# Patient Record
Sex: FEMALE
[De-identification: names, ages, dates, MRNs, and addresses within clinical notes are randomized per-mention and may not be internally consistent; named-entity substitution may affect disease eponyms.]

---

## 2017-07-12 ENCOUNTER — OTHER (OUTPATIENT)
Age: 22
End: 2017-07-12

## 2017-07-17 ENCOUNTER — APPOINTMENT (OUTPATIENT)
Dept: OBGYN | Facility: CLINIC | Age: 22
End: 2017-07-17

## 2017-07-17 VITALS
SYSTOLIC BLOOD PRESSURE: 100 MMHG | WEIGHT: 127 LBS | BODY MASS INDEX: 23.37 KG/M2 | HEIGHT: 62 IN | DIASTOLIC BLOOD PRESSURE: 60 MMHG

## 2017-07-20 ENCOUNTER — APPOINTMENT (OUTPATIENT)
Dept: SURGICAL ONCOLOGY | Facility: CLINIC | Age: 22
End: 2017-07-20

## 2017-07-20 VITALS
RESPIRATION RATE: 15 BRPM | BODY MASS INDEX: 23.37 KG/M2 | DIASTOLIC BLOOD PRESSURE: 72 MMHG | HEIGHT: 62 IN | SYSTOLIC BLOOD PRESSURE: 111 MMHG | WEIGHT: 127 LBS | HEART RATE: 73 BPM

## 2017-08-08 ENCOUNTER — TRANSCRIPTION ENCOUNTER (OUTPATIENT)
Age: 22
End: 2017-08-08

## 2018-07-12 ENCOUNTER — APPOINTMENT (OUTPATIENT)
Dept: SURGICAL ONCOLOGY | Facility: CLINIC | Age: 23
End: 2018-07-12
Payer: COMMERCIAL

## 2018-07-12 VITALS
BODY MASS INDEX: 23.92 KG/M2 | WEIGHT: 130 LBS | HEIGHT: 62 IN | HEART RATE: 71 BPM | SYSTOLIC BLOOD PRESSURE: 112 MMHG | DIASTOLIC BLOOD PRESSURE: 75 MMHG | OXYGEN SATURATION: 100 %

## 2018-07-12 PROCEDURE — 99213 OFFICE O/P EST LOW 20 MIN: CPT

## 2018-07-16 ENCOUNTER — APPOINTMENT (OUTPATIENT)
Dept: OBGYN | Facility: CLINIC | Age: 23
End: 2018-07-16
Payer: COMMERCIAL

## 2018-07-16 VITALS
SYSTOLIC BLOOD PRESSURE: 118 MMHG | BODY MASS INDEX: 23.37 KG/M2 | HEIGHT: 62 IN | WEIGHT: 127 LBS | DIASTOLIC BLOOD PRESSURE: 70 MMHG

## 2018-07-16 PROCEDURE — 99395 PREV VISIT EST AGE 18-39: CPT

## 2018-07-19 ENCOUNTER — APPOINTMENT (OUTPATIENT)
Dept: SURGICAL ONCOLOGY | Facility: CLINIC | Age: 23
End: 2018-07-19

## 2018-07-23 LAB — CYTOLOGY CVX/VAG DOC THIN PREP: NORMAL

## 2019-07-18 ENCOUNTER — APPOINTMENT (OUTPATIENT)
Dept: SURGICAL ONCOLOGY | Facility: CLINIC | Age: 24
End: 2019-07-18
Payer: COMMERCIAL

## 2019-07-18 VITALS
BODY MASS INDEX: 22.45 KG/M2 | WEIGHT: 122 LBS | HEART RATE: 75 BPM | DIASTOLIC BLOOD PRESSURE: 71 MMHG | SYSTOLIC BLOOD PRESSURE: 110 MMHG | RESPIRATION RATE: 15 BRPM | HEIGHT: 62 IN

## 2019-07-18 PROCEDURE — 99214 OFFICE O/P EST MOD 30 MIN: CPT

## 2019-07-22 ENCOUNTER — APPOINTMENT (OUTPATIENT)
Dept: OBGYN | Facility: CLINIC | Age: 24
End: 2019-07-22
Payer: COMMERCIAL

## 2019-07-22 VITALS
HEIGHT: 62 IN | SYSTOLIC BLOOD PRESSURE: 106 MMHG | BODY MASS INDEX: 21.9 KG/M2 | DIASTOLIC BLOOD PRESSURE: 60 MMHG | WEIGHT: 119 LBS

## 2019-07-22 PROCEDURE — 99395 PREV VISIT EST AGE 18-39: CPT

## 2019-07-22 NOTE — PHYSICAL EXAM
[Awake] : awake [Alert] : alert [Acute Distress] : no acute distress [LAD] : no lymphadenopathy [Thyroid Nodule] : no thyroid nodule [Goiter] : no goiter [Mass] : no breast mass [Nipple Discharge] : no nipple discharge [Axillary LAD] : no axillary lymphadenopathy [Soft] : soft [Tender] : non tender [Oriented x3] : oriented to person, place, and time [Normal] : uterus [No Bleeding] : there was no active vaginal bleeding [Uterine Adnexae] : were not tender and not enlarged [RRR, No Murmurs] : RRR, no murmurs [CTAB] : CTAB [de-identified] : no areas of redness

## 2019-07-29 LAB — CYTOLOGY CVX/VAG DOC THIN PREP: NORMAL

## 2020-04-07 NOTE — PHYSICAL EXAM
[Normal] : supple, no neck mass and thyroid not enlarged [Normal Neck Lymph Nodes] : normal neck lymph nodes  [Normal Supraclavicular Lymph Nodes] : normal supraclavicular lymph nodes [Normal Axillary Lymph Nodes] : normal axillary lymph nodes [Normal] : normal appearance, no rash, nodules, vesicles, ulcers, erythema [de-identified] : Groins not examined [de-identified] : below

## 2020-04-07 NOTE — ASSESSMENT
[FreeTextEntry1] : Asymptomatic with a stable physical examination.\par \par July 2019 bilateral breast ultrasounds at [facility]: BI-RADS 2.\par Prescription provided for 2020.\par \par We should see her in another year, sooner if needed.

## 2020-04-07 NOTE — HISTORY OF PRESENT ILLNESS
[de-identified] : 23 year-old lady seen 2014 with a lump in the upper outer part of her right breast.\par Subsequent core needle biopsy at [facility]: fibroadenoma.\par \par In July 2016, followup bilateral ultrasounds showed no significant change, BI-RADS 2.\par July 5, 2018, breast sonos @[facility]: stability of palpable fibroadenoma along with other, multiple, bilateral solid breast masses, NO changes.\par \par She returns for scheduled followup today.\par \par \par She is asymptomatic.\par \par +FH:\par Her paternal grandmother had breast cancer.\par A paternal (second) cousin also had breast cancer\par \par There are no relatives with ovarian cancer\par \par + Ashkenazi Heritage.\par \par She is nulliparous\par Her menarche was at age 12.\par \par \par GYN: Dr. Prabhu GUPTA, a visit in July 2017 was unremarkable\par Followup is scheduled for July 2019\par \par Her internist is Dr. Stephon PERALTA\par \par No significant PMH\par No meds

## 2020-04-07 NOTE — REASON FOR VISIT
[Follow-Up Visit] : a follow-up visit for [Other: _____] : [unfilled] [FreeTextEntry2] : Palpable right breast mass

## 2020-07-16 ENCOUNTER — APPOINTMENT (OUTPATIENT)
Dept: SURGICAL ONCOLOGY | Facility: CLINIC | Age: 25
End: 2020-07-16
Payer: COMMERCIAL

## 2020-07-16 VITALS
HEART RATE: 74 BPM | SYSTOLIC BLOOD PRESSURE: 111 MMHG | OXYGEN SATURATION: 99 % | BODY MASS INDEX: 22.45 KG/M2 | RESPIRATION RATE: 15 BRPM | HEIGHT: 62 IN | DIASTOLIC BLOOD PRESSURE: 71 MMHG | WEIGHT: 122 LBS

## 2020-07-16 VITALS — TEMPERATURE: 97.3 F

## 2020-07-16 PROCEDURE — 99214 OFFICE O/P EST MOD 30 MIN: CPT

## 2020-07-27 ENCOUNTER — APPOINTMENT (OUTPATIENT)
Dept: OBGYN | Facility: CLINIC | Age: 25
End: 2020-07-27
Payer: COMMERCIAL

## 2020-07-27 VITALS
TEMPERATURE: 98.4 F | SYSTOLIC BLOOD PRESSURE: 96 MMHG | HEIGHT: 62 IN | WEIGHT: 121 LBS | DIASTOLIC BLOOD PRESSURE: 60 MMHG | BODY MASS INDEX: 22.26 KG/M2

## 2020-07-27 DIAGNOSIS — Z01.419 ENCOUNTER FOR GYNECOLOGICAL EXAMINATION (GENERAL) (ROUTINE) W/OUT ABNORMAL FINDINGS: ICD-10-CM

## 2020-07-27 PROCEDURE — 99395 PREV VISIT EST AGE 18-39: CPT

## 2020-07-27 NOTE — PHYSICAL EXAM
[Alert] : alert [Awake] : awake [Acute Distress] : no acute distress [LAD] : no lymphadenopathy [Thyroid Nodule] : no thyroid nodule [Goiter] : no goiter [Mass] : no breast mass [Axillary LAD] : no axillary lymphadenopathy [Nipple Discharge] : no nipple discharge [Soft] : soft [Tender] : non tender [Oriented x3] : oriented to person, place, and time [No Bleeding] : there was no active vaginal bleeding [Normal] : uterus [RRR, No Murmurs] : RRR, no murmurs [Uterine Adnexae] : were not tender and not enlarged [CTAB] : CTAB

## 2020-07-31 LAB — CYTOLOGY CVX/VAG DOC THIN PREP: NORMAL

## 2020-08-06 NOTE — PHYSICAL EXAM
[Normal] : supple, no neck mass and thyroid not enlarged [Normal Axillary Lymph Nodes] : normal axillary lymph nodes [Normal Supraclavicular Lymph Nodes] : normal supraclavicular lymph nodes [Normal Neck Lymph Nodes] : normal neck lymph nodes  [Normal] : normal appearance, no rash, nodules, vesicles, ulcers, erythema [de-identified] : Groins not examined [de-identified] : below

## 2020-08-06 NOTE — HISTORY OF PRESENT ILLNESS
[de-identified] : 25 year-old lady seen 2014 with a lump in the upper outer part of her right breast.\par Subsequent core needle biopsy at [facility]: fibroadenoma.\par \par In July 2016, followup bilateral ultrasounds showed no significant change, BI-RADS 2.\par July 5, 2018, breast sonos @[facility]: stability of palpable fibroadenoma along with other, multiple, bilateral solid breast masses, NO changes.\par July 2020:\par Bilateral breast sonograms at [facility]: BI-RADS 2.\par Stable palpable right breast fibroadenoma.\par No changes in the other solid nodules.\par Prescription provided for July 2020\par She returns for scheduled followup today.\par \par \par She is asymptomatic.\par \par +FH:\par Her paternal grandmother had breast cancer.\par A paternal (second) cousin also had breast cancer\par \par There are no relatives with ovarian cancer\par \par + Ashkenazi Heritage.\par \par She is nulliparous\par Her menarche was at age 12.\par \par Her breast cancer risk score, calculated 7/16/2020: 43.6\par \par \par GYN: Dr. Prabhu GUPTA, a visit in July 2019 was unremarkable\par Followup is scheduled for July 2019\par \par \par Her internist is Dr. Stephon PERALTA\par \par No significant PMH\par No meds

## 2020-08-06 NOTE — REASON FOR VISIT
[Follow-Up Visit] : a follow-up visit for [Other: _____] : [unfilled] [FreeTextEntry2] : Palpable right breast fibroadenoma

## 2020-12-07 ENCOUNTER — TRANSCRIPTION ENCOUNTER (OUTPATIENT)
Age: 25
End: 2020-12-07

## 2021-03-09 ENCOUNTER — TRANSCRIPTION ENCOUNTER (OUTPATIENT)
Age: 26
End: 2021-03-09

## 2021-06-03 ENCOUNTER — RESULT REVIEW (OUTPATIENT)
Age: 26
End: 2021-06-03

## 2021-07-22 ENCOUNTER — APPOINTMENT (OUTPATIENT)
Dept: SURGICAL ONCOLOGY | Facility: CLINIC | Age: 26
End: 2021-07-22
Payer: COMMERCIAL

## 2021-07-22 VITALS
OXYGEN SATURATION: 99 % | RESPIRATION RATE: 18 BRPM | HEART RATE: 63 BPM | DIASTOLIC BLOOD PRESSURE: 82 MMHG | WEIGHT: 124 LBS | HEIGHT: 62 IN | SYSTOLIC BLOOD PRESSURE: 122 MMHG | BODY MASS INDEX: 22.82 KG/M2

## 2021-07-22 DIAGNOSIS — N63.0 UNSPECIFIED LUMP IN UNSPECIFIED BREAST: ICD-10-CM

## 2021-07-22 PROCEDURE — 99072 ADDL SUPL MATRL&STAF TM PHE: CPT

## 2021-07-22 PROCEDURE — 99215 OFFICE O/P EST HI 40 MIN: CPT

## 2021-08-11 ENCOUNTER — TRANSCRIPTION ENCOUNTER (OUTPATIENT)
Age: 26
End: 2021-08-11

## 2021-10-21 ENCOUNTER — TRANSCRIPTION ENCOUNTER (OUTPATIENT)
Age: 26
End: 2021-10-21

## 2021-11-03 ENCOUNTER — TRANSCRIPTION ENCOUNTER (OUTPATIENT)
Age: 26
End: 2021-11-03

## 2022-01-28 NOTE — REASON FOR VISIT
[Follow-Up Visit] : a follow-up visit for [Other: _____] : [unfilled] [FreeTextEntry2] : Palpable right breast fibroadenoma (UOQ), AND increased risk of breast cancer

## 2022-01-28 NOTE — PHYSICAL EXAM
[Normal] : supple, no neck mass and thyroid not enlarged [Normal Neck Lymph Nodes] : normal neck lymph nodes  [Normal Supraclavicular Lymph Nodes] : normal supraclavicular lymph nodes [Normal Axillary Lymph Nodes] : normal axillary lymph nodes [Normal] : normal appearance, no rash, nodules, vesicles, ulcers, erythema [de-identified] : Groins not examined [de-identified] : below

## 2022-01-28 NOTE — HISTORY OF PRESENT ILLNESS
[de-identified] : 26 year-old lady initially seen 2014 with a palpable lump in the upper outer part of her right breast.\par Subsequent core needle biopsy at [facility]: fibroadenoma.\par \par In July 2016, followup bilateral ultrasounds: No significant change, BI-RADS 2.\par July 5, 2018, breast sonos @[facility]: NO changes.\par July 2020:\par Bilateral breast sonograms at [facility]: BI-RADS 2.\par Stable palpable right breast fibroadenoma.\par No changes in the other solid nodules.\par \par \par She returns for scheduled followup today.\par \par \par She is asymptomatic.\par \par +FH:\par A maternal second cousin is + BRCA, inherited from her father's side.\par She had prophylactic mastectomies, but not breast or ovarian malignancy.\par \par Apaternal (second) cousin also had breast cancer\par \par No relatives with ovarian cancer\par \par + Ashkenazi Heritage.\par \par Other family history of malignancy:\par Sister: Thyroid cancer\par Her paternal grandmother had breast cancer.\par Paternal grandfather: CRC.\par Father: Benign colonic polyps.\par \par She is nulliparous\par Her menarche was at age 12.\par \par Her breast cancer risk score, calculated 7/16/2020: 43.6\par \par \par Her internist is Dr. Stephon EPRALTA\par \par No significant PMH\par No meds\par \par \par GYN: Dr. Shiela ALLEN.\par June 2021 visit was okay\par she used to see Dr. Prabhu Stokes

## 2022-01-28 NOTE — ASSESSMENT
[FreeTextEntry1] : Clinically doing well\par \par At her July 2020 visit, I suggested a baseline breast MRI.\par Breast cancer risk score = 43.6.\par \par She declined, give it some thought, and decided against having this test.\par \par \par July 2021:\par Bilateral breast ultrasounds at [facility]: BI-RADS 2.\par Prescription provided for July 2022.\par \par Clinically doing well.\par \par If asymptomatic, with normal imaging, we will see her in another year, sooner if needed\par \par She had many questions about the use of hormonal birth control options, including NuvaRing, impregnated IUDs, and oral contraceptives.\par From my perspective, there is no contraindication to using any of these agents.\par She is concerned about the possible stimulation of growth in her known benign breast tumors, which unfortunately are a possibility.\par She will have further discussions with her gynecologist.\par \par \par 8/11/2021:\par I returned her call.\par I answered additional questions that she had about possible effects of contraceptives, on her fibroadenoma.\par \par \par \par 1/28/2022.\par I returned her call.\par She is going to start the NuvaRing for contraception presently.\par She spoke to her gynecologist yesterday.\par \par She would like to have her baseline breast MRI.\par Breast cancer risk score =43.6%.\par Prescription entered.\par She will call me 1 week after the MRI to discuss the results.

## 2022-02-15 ENCOUNTER — TRANSCRIPTION ENCOUNTER (OUTPATIENT)
Age: 27
End: 2022-02-15

## 2022-02-17 ENCOUNTER — TRANSCRIPTION ENCOUNTER (OUTPATIENT)
Age: 27
End: 2022-02-17

## 2022-02-20 ENCOUNTER — TRANSCRIPTION ENCOUNTER (OUTPATIENT)
Age: 27
End: 2022-02-20

## 2022-04-27 ENCOUNTER — TRANSCRIPTION ENCOUNTER (OUTPATIENT)
Age: 27
End: 2022-04-27

## 2022-05-10 ENCOUNTER — NON-APPOINTMENT (OUTPATIENT)
Age: 27
End: 2022-05-10

## 2022-05-19 ENCOUNTER — NON-APPOINTMENT (OUTPATIENT)
Age: 27
End: 2022-05-19

## 2022-06-02 ENCOUNTER — NON-APPOINTMENT (OUTPATIENT)
Age: 27
End: 2022-06-02

## 2022-06-30 ENCOUNTER — RESULT REVIEW (OUTPATIENT)
Age: 27
End: 2022-06-30

## 2022-07-18 ENCOUNTER — NON-APPOINTMENT (OUTPATIENT)
Age: 27
End: 2022-07-18

## 2022-08-15 ENCOUNTER — APPOINTMENT (OUTPATIENT)
Dept: SURGICAL ONCOLOGY | Facility: CLINIC | Age: 27
End: 2022-08-15

## 2022-08-15 DIAGNOSIS — Z91.89 OTHER SPECIFIED PERSONAL RISK FACTORS, NOT ELSEWHERE CLASSIFIED: ICD-10-CM

## 2022-08-15 RX ORDER — ETONOGESTREL AND ETHINYL ESTRADIOL 11.7; 2.7 MG/1; MG/1
0.12-0.015 INSERT, EXTENDED RELEASE VAGINAL
Qty: 3 | Refills: 0 | Status: ACTIVE | COMMUNITY
Start: 2022-05-27

## 2022-08-15 RX ORDER — AMOXICILLIN 875 MG/1
875 TABLET, FILM COATED ORAL
Qty: 20 | Refills: 0 | Status: ACTIVE | COMMUNITY
Start: 2022-07-22

## 2022-08-15 NOTE — ASSESSMENT
[FreeTextEntry1] : Clinically doing well\par \par At her July 2020 visit, I suggested a baseline breast MRI.\par Breast cancer risk score = 43.6.\par \par She declined.\par \par \par July 2022:\par Bilateral breast ultrasounds at [facility]: BI-RADS 2.\par Prescription provided for July 2023.\par \par Clinically doing well.\par \par If asymptomatic, with normal imaging, we will see her in another year, sooner if needed\par \par She had many questions about the use of hormonal birth control options, including NuvaRing, impregnated IUDs, and oral contraceptives.\par From my perspective, there is no contraindication to using any of these agents.\par She is concerned about the possible stimulation of growth in her known benign breast tumors, which unfortunately are a possibility.\par She will have further discussions with her gynecologist.\par \par \par 8/11/2021:\par I returned her call.\par I answered additional questions that she had about possible effects of contraceptives, on her fibroadenoma.\par \par \par \par 1/28/2022.\par I returned her call.\par She is going to start the NuvaRing for contraception presently.\par She spoke to her gynecologist yesterday.\par \par She would like to have her baseline breast MRI.\par Breast cancer risk score =43.6%.\par Prescription entered.\par She will call me 1 week after the MRI to discuss the results.

## 2022-08-15 NOTE — HISTORY OF PRESENT ILLNESS
[de-identified] : 27 year-old lady initially seen 2014 with a palpable lump in the right breast (UOQ).\par Subsequent core needle biopsy at [facility]: fibroadenoma.\par \par In July 2016, followup bilateral ultrasounds: No significant change, BI-RADS 2.\par July 5, 2018, breast sonos @[facility]: NO changes.\par July 2020:\par Bilateral breast sonograms at [facility]: BI-RADS 2.\par Stable palpable right breast fibroadenoma.\par No changes in the other solid nodules.\par \par July 2022 bilateral breast ultrasound at [facility]: BI-RADS 2.\par \par \par She returns for scheduled followup today.\par \par \par CC:\par \par +FH:\par A maternal second cousin is + BRCA, inherited from her father's side.\par She had prophylactic mastectomies, but not breast or ovarian malignancy.\par \par A paternal (second) cousin had breast cancer\par \par No relatives with ovarian cancer\par \par + Ashkenazi Heritage.\par \par \par Other family history of malignancy:\par Sister: Thyroid cancer\par Her paternal grandmother had breast cancer.\par Paternal grandfather: CRC.\par Father: Benign colonic polyps.\par \par \par She is nulliparous\par Her menarche was at age 12.\par \par \par Her breast cancer risk score, calculated 7/16/2020: 43.6\par \par \par Her internist is Dr. Stephon PERALTA\par \par No significant PMH\par No meds\par \par \par GYN: Dr. Shiela ALLEN.\par June 2021 visit was okay\par she used to see Dr. Prabhu Stokes.\par January 2022 she started using the NuvaRing

## 2022-08-15 NOTE — PHYSICAL EXAM
[Normal] : supple, no neck mass and thyroid not enlarged [Normal Neck Lymph Nodes] : normal neck lymph nodes  [Normal Supraclavicular Lymph Nodes] : normal supraclavicular lymph nodes [Normal Axillary Lymph Nodes] : normal axillary lymph nodes [Normal] : normal appearance, no rash, nodules, vesicles, ulcers, erythema [de-identified] : Groins not examined [de-identified] : below

## 2024-08-08 ENCOUNTER — APPOINTMENT (OUTPATIENT)
Dept: SURGICAL ONCOLOGY | Facility: CLINIC | Age: 29
End: 2024-08-08